# Patient Record
Sex: MALE | Employment: OTHER | URBAN - METROPOLITAN AREA
[De-identification: names, ages, dates, MRNs, and addresses within clinical notes are randomized per-mention and may not be internally consistent; named-entity substitution may affect disease eponyms.]

---

## 2020-04-27 ENCOUNTER — OFFICE VISIT (OUTPATIENT)
Dept: ORTHOPEDIC SURGERY | Age: 50
End: 2020-04-27
Payer: COMMERCIAL

## 2020-04-27 VITALS — BODY MASS INDEX: 26.34 KG/M2 | HEIGHT: 70 IN | WEIGHT: 184 LBS

## 2020-04-27 PROCEDURE — 99203 OFFICE O/P NEW LOW 30 MIN: CPT | Performed by: ORTHOPAEDIC SURGERY

## 2020-04-27 NOTE — PROGRESS NOTES
CHIEF COMPLAINT:    Chief Complaint   Patient presents with    Pain     Right Calf - pain with running/  training for Marathon  - has rested for 1 month  no pain at rest - did short (3 miles) had to leap over a curb and felt some pain       HISTORY OF PRESENT ILLNESS:    Skeeter Felty is a very avid runner and runs marathons. He has been struggle with medial sided proximal tibia pain. He has not run in about 3 to 4 weeks and now the pain is dramatically better. He describes it is extremely sharp and extremely focal along the metaphyseal diaphyseal junction of the right knee only. Denies any numbness or tingling. He denies any symptoms whatsoever on the contralateral side. He denies any knee pain and reports no knee swelling or ankle pain. He already uses shoe inserts. He does not recall using shoes with lateral heel wedges. The patient is a 51 y.o. male   No past medical history on file. Work Status: Professional     The pain assessment was noted & is as follows:  Pain Assessment  Severity of Pain: 5  Quality of Pain: Sharp, Dull, Other (Comment)  Duration of Pain: Persistent  Frequency of Pain: Intermittent  Limiting Behavior: Yes  Result of Injury: No  Work-Related Injury: No]      Work Status/Functionality:     Past Medical History: Medical history form was reviewed today & can be found in the media tab  No past medical history on file. Past Surgical History:     No past surgical history on file. Current Medications:   No current outpatient medications on file. Allergies:  Patient has no known allergies. Social History:    reports that he has never smoked. He does not have any smokeless tobacco history on file. Family History:   No family history on file. REVIEW OF SYSTEMS:   For new problems, a full review of systems will be found scanned in the patient's chart.   CONSTITUTIONAL: Denies unexplained weight loss, fevers, chills   NEUROLOGICAL: Denies unsteady gait or fracture. We had a lengthy discussion regarding the diagnosis here. There are several things he can do to help. He is going to gradually get into a return to his running program.  He is going to utilize a lateral heel wedge. He is going to cross train. 2.  He understands he cannot alter the anatomic change of his right leg versus his Lefton this of course is predisposed him to the medial sided stress injury. 3.  No evidence of intra-articular knee pathology. I have personally performed and/or participated in the history, exam and medical decision making and agree with all pertinent clinical information. I have also reviewed and agree with the past medical, family and social history unless otherwise noted. This dictation was performed with a verbal recognition program (DRAGON) and it was checked for errors. It is possible that there are still dictated errors within this office note. If so, please bring any errors to my attention for an addendum. All efforts were made to ensure that this office note is accurate.           Anders Kruger MD